# Patient Record
Sex: MALE | Race: BLACK OR AFRICAN AMERICAN | NOT HISPANIC OR LATINO | Employment: OTHER | URBAN - METROPOLITAN AREA
[De-identification: names, ages, dates, MRNs, and addresses within clinical notes are randomized per-mention and may not be internally consistent; named-entity substitution may affect disease eponyms.]

---

## 2018-08-10 ENCOUNTER — HOSPITAL ENCOUNTER (EMERGENCY)
Facility: HOSPITAL | Age: 58
Discharge: HOME | End: 2018-08-10
Attending: EMERGENCY MEDICINE
Payer: COMMERCIAL

## 2018-08-10 VITALS
SYSTOLIC BLOOD PRESSURE: 126 MMHG | DIASTOLIC BLOOD PRESSURE: 92 MMHG | RESPIRATION RATE: 19 BRPM | OXYGEN SATURATION: 100 % | HEIGHT: 71 IN | TEMPERATURE: 98 F | WEIGHT: 160 LBS | HEART RATE: 58 BPM | BODY MASS INDEX: 22.4 KG/M2

## 2018-08-10 DIAGNOSIS — F12.90 MARIJUANA USE: ICD-10-CM

## 2018-08-10 DIAGNOSIS — N18.9 CHRONIC RENAL IMPAIRMENT, UNSPECIFIED CKD STAGE: ICD-10-CM

## 2018-08-10 DIAGNOSIS — K57.90 DIVERTICULOSIS OF INTESTINE WITHOUT BLEEDING, UNSPECIFIED INTESTINAL TRACT LOCATION: Primary | ICD-10-CM

## 2018-08-10 DIAGNOSIS — R10.13 EPIGASTRIC PAIN: ICD-10-CM

## 2018-08-10 DIAGNOSIS — K21.9 GASTROESOPHAGEAL REFLUX DISEASE, ESOPHAGITIS PRESENCE NOT SPECIFIED: ICD-10-CM

## 2018-08-10 LAB
ALBUMIN SERPL-MCNC: 3.6 G/DL (ref 3.3–5.5)
ALP SERPL-CCNC: 64 U/L (ref 42–141)
BILIRUB SERPL-MCNC: 0.5 MG/DL (ref 0.2–1.6)
BILIRUBIN, POC UA: NEGATIVE
BLOOD, POC UA: NEGATIVE
BUN SERPL-MCNC: 21 MG/DL (ref 7–22)
CALCIUM SERPL-MCNC: 9.9 MG/DL (ref 8–10.3)
CHLORIDE SERPL-SCNC: 99 MMOL/L (ref 98–108)
CLARITY, POC UA: CLEAR
COLOR, POC UA: YELLOW
CREAT SERPL-MCNC: 1.5 MG/DL (ref 0.6–1.2)
GLUCOSE SERPL-MCNC: 97 MG/DL (ref 73–118)
GLUCOSE, POC UA: NEGATIVE
KETONES, POC UA: NEGATIVE
LEUKOCYTE EST, POC UA: NEGATIVE
NITRITE, POC UA: NEGATIVE
PH UR STRIP: 5.5 [PH]
POC ALT (SGPT): 16 U/L (ref 10–47)
POC AST (SGOT): 25 U/L (ref 11–38)
POC B-TYPE NATRIURETIC PEPTIDE: 45.3 PG/ML (ref 0–100)
POC CARDIAC TROPONIN I: 0 NG/ML
POC PTINR: 0.9 (ref 0.9–1.2)
POC PTWBT: 11.1 SEC (ref 9.7–14.3)
POC TCO2: 27 MMOL/L (ref 18–33)
POTASSIUM BLD-SCNC: 4 MMOL/L (ref 3.6–5.1)
PROTEIN, POC UA: NEGATIVE
PROTEIN, POC: 7.3 G/DL (ref 6.4–8.1)
SAMPLE: NORMAL
SAMPLE: NORMAL
SODIUM BLD-SCNC: 141 MMOL/L (ref 128–145)
SPECIFIC GRAVITY, POC UA: 1.02
UROBILINOGEN, POC UA: 0.2 E.U./DL

## 2018-08-10 PROCEDURE — 85025 COMPLETE CBC W/AUTO DIFF WBC: CPT

## 2018-08-10 PROCEDURE — S0028 INJECTION, FAMOTIDINE, 20 MG: HCPCS | Performed by: EMERGENCY MEDICINE

## 2018-08-10 PROCEDURE — 83880 ASSAY OF NATRIURETIC PEPTIDE: CPT

## 2018-08-10 PROCEDURE — 25000003 PHARM REV CODE 250: Performed by: EMERGENCY MEDICINE

## 2018-08-10 PROCEDURE — 80053 COMPREHEN METABOLIC PANEL: CPT

## 2018-08-10 PROCEDURE — 99284 EMERGENCY DEPT VISIT MOD MDM: CPT | Mod: 25

## 2018-08-10 PROCEDURE — 25000242 PHARM REV CODE 250 ALT 637 W/ HCPCS: Performed by: EMERGENCY MEDICINE

## 2018-08-10 PROCEDURE — 94640 AIRWAY INHALATION TREATMENT: CPT

## 2018-08-10 PROCEDURE — 93010 ELECTROCARDIOGRAM REPORT: CPT | Mod: ,,, | Performed by: INTERNAL MEDICINE

## 2018-08-10 PROCEDURE — 94760 N-INVAS EAR/PLS OXIMETRY 1: CPT

## 2018-08-10 PROCEDURE — 84484 ASSAY OF TROPONIN QUANT: CPT

## 2018-08-10 PROCEDURE — 85610 PROTHROMBIN TIME: CPT

## 2018-08-10 PROCEDURE — 96374 THER/PROPH/DIAG INJ IV PUSH: CPT

## 2018-08-10 PROCEDURE — 81003 URINALYSIS AUTO W/O SCOPE: CPT

## 2018-08-10 PROCEDURE — 93005 ELECTROCARDIOGRAM TRACING: CPT

## 2018-08-10 RX ORDER — ALBUTEROL SULFATE 90 UG/1
2 AEROSOL, METERED RESPIRATORY (INHALATION) EVERY 6 HOURS PRN
COMMUNITY

## 2018-08-10 RX ORDER — FAMOTIDINE 20 MG/1
20 TABLET, FILM COATED ORAL 2 TIMES DAILY
Qty: 30 TABLET | Refills: 0 | Status: SHIPPED | OUTPATIENT
Start: 2018-08-10 | End: 2019-08-10

## 2018-08-10 RX ORDER — IPRATROPIUM BROMIDE AND ALBUTEROL SULFATE 2.5; .5 MG/3ML; MG/3ML
3 SOLUTION RESPIRATORY (INHALATION) ONCE
Status: COMPLETED | OUTPATIENT
Start: 2018-08-10 | End: 2018-08-10

## 2018-08-10 RX ORDER — DIPHENHYDRAMINE HCL 25 MG
25 CAPSULE ORAL NIGHTLY PRN
Qty: 20 CAPSULE | Refills: 0 | Status: SHIPPED | OUTPATIENT
Start: 2018-08-10

## 2018-08-10 RX ORDER — PRAVASTATIN SODIUM 40 MG/1
40 TABLET ORAL DAILY
COMMUNITY

## 2018-08-10 RX ORDER — AMLODIPINE BESYLATE 5 MG/1
5 TABLET ORAL DAILY
COMMUNITY

## 2018-08-10 RX ORDER — FAMOTIDINE 10 MG/ML
20 INJECTION INTRAVENOUS
Status: COMPLETED | OUTPATIENT
Start: 2018-08-10 | End: 2018-08-10

## 2018-08-10 RX ORDER — ASPIRIN 325 MG
325 TABLET ORAL
Status: COMPLETED | OUTPATIENT
Start: 2018-08-10 | End: 2018-08-10

## 2018-08-10 RX ORDER — LISINOPRIL AND HYDROCHLOROTHIAZIDE 12.5; 2 MG/1; MG/1
1 TABLET ORAL DAILY
COMMUNITY

## 2018-08-10 RX ADMIN — ASPIRIN 325 MG ORAL TABLET 325 MG: 325 PILL ORAL at 02:08

## 2018-08-10 RX ADMIN — FAMOTIDINE 20 MG: 10 INJECTION, SOLUTION INTRAVENOUS at 02:08

## 2018-08-10 RX ADMIN — LIDOCAINE HYDROCHLORIDE: 20 SOLUTION ORAL; TOPICAL at 02:08

## 2018-08-10 RX ADMIN — IPRATROPIUM BROMIDE AND ALBUTEROL SULFATE 3 ML: .5; 2.5 SOLUTION RESPIRATORY (INHALATION) at 02:08

## 2018-08-10 NOTE — ED PROVIDER NOTES
Encounter Date: 8/10/2018    SCRIBE #1 NOTE: I, Marlin Tubbs, am scribing for, and in the presence of,  Dr. Ford. I have scribed the entire note.       History     Chief Complaint   Patient presents with    Abdominal Pain     pt reports epigastric pain x 2 months and burning sensation has gotten worse over the past week. pt denies chest pain, SOB or N/V/D.     57 y.o male presents with acute on chronic abdominal pain for 2 months. He describes his pain as burning in quality. He took half a dose of zantac this morning for pain and little relief. He came in today due to his pain worsening and not being able to sleep. He has associated intermittent dysuria and increased urine frequency. He denies chest pain, vomiting, nausea, diarrhea, and SOB. He saw his PCP a month ago, but he reports this complaint was not addressed. He has a medical history of peptic ulcer disease and is not sure when he was diagnosed. He also has not seen a gastroenterologist. He reports he smokes marijuana everyday. He rates his pain as a 5/10.      The history is provided by the patient.   Abdominal Pain   The current episode started several weeks ago. The onset of the illness was abrupt. The problem has been gradually worsening. The abdominal pain is located in the epigastric region. The abdominal pain does not radiate. The severity of the abdominal pain is 5/10. The abdominal pain is relieved by nothing. The other symptoms of the illness do not include fever, shortness of breath, nausea, vomiting, diarrhea or dysuria.   The patient states that she believes she is currently not pregnant. The patient has not had a change in bowel habit. Additional symptoms associated with the illness include frequency. Symptoms associated with the illness do not include back pain. Significant associated medical issues include PUD.     Review of patient's allergies indicates:  No Known Allergies  Past Medical History:   Diagnosis Date    Alcohol abuse      Duodenal ulcer disease 2013    History of bleeding ulcers 2013    gastric ulcer s/p clip    Hypertension     PUD (peptic ulcer disease) 2013     No past surgical history on file.  No family history on file.  Social History   Substance Use Topics    Smoking status: Current Some Day Smoker     Packs/day: 0.50    Smokeless tobacco: Not on file    Alcohol use Yes      Comment: Socially     Review of Systems   Constitutional: Negative for fever.   HENT: Negative for sore throat.    Respiratory: Negative for shortness of breath.    Cardiovascular: Negative for chest pain.   Gastrointestinal: Positive for abdominal pain. Negative for blood in stool, diarrhea, nausea and vomiting.   Genitourinary: Positive for frequency. Negative for dysuria.   Musculoskeletal: Negative for back pain.   Skin: Negative for rash.   Neurological: Negative for weakness.   Hematological: Does not bruise/bleed easily.       Physical Exam     Initial Vitals [08/10/18 1331]   BP Pulse Resp Temp SpO2   (!) 130/98 67 19 98 °F (36.7 °C) 98 %      MAP       --         Physical Exam    Nursing note and vitals reviewed.  Constitutional: He appears well-developed and well-nourished. He is not diaphoretic. No distress.   HENT:   Head: Normocephalic and atraumatic.   Eyes: EOM are normal.   Neck: Normal range of motion. Neck supple.   Cardiovascular: Normal rate, regular rhythm and normal heart sounds. Exam reveals no gallop and no friction rub.    No murmur heard.  Pulmonary/Chest: No respiratory distress. He has wheezes (expiratory). He has no rhonchi. He has no rales.   Abdominal: Soft. He exhibits no distension. There is tenderness in the epigastric area. There is no rebound and no guarding.   Neurological: He is alert and oriented to person, place, and time.   Skin: Skin is warm and dry.         ED Course   Procedures  Labs Reviewed   POCT URINALYSIS W/O SCOPE - Abnormal; Notable for the following components:       Result Value    Glucose, UA  Negative (*)     Bilirubin, UA Negative (*)     Ketones, UA Negative (*)     Blood, UA Negative (*)     Protein, UA Negative (*)     Nitrite, UA Negative (*)     Leukocytes, UA Negative (*)     All other components within normal limits   POCT CMP - Abnormal; Notable for the following components:    POC Creatinine 1.5 (*)     All other components within normal limits   TROPONIN ISTAT   POCT CBC   POCT URINALYSIS W/O SCOPE   POCT CMP   POCT PROTIME-INR   POCT TROPONIN   POCT B-TYPE NATRIURETIC PEPTIDE (BNP)   POCT B-TYPE NATRIURETIC PEPTIDE (BNP)   ISTAT PROCEDURE     CBC white blood cell count 4.6, hemoglobin 12.5, hematocrit 38.6 and platelets 270  CMP sodium 141, potassium 4.0, bicarb 29, chloride 99, glucose 97, BUN 21 creatinine 0.0 1.5.  When compared to prior labs 5 years ago patient's BUN was 65 and creatinine is 1.4.  Labs today are improved  Troponin 0.00.  BNP is 45, INR is 0.9.    EKG Readings: (Independently Interpreted)   Initial Reading: No STEMI. Previous EKG: Compared with most recent EKG Previous EKG Date: 1/21/2018 rate increased by 6 BPM today. Rhythm: Normal Sinus Rhythm. Heart Rate: 67. Axis: Normal. Other Impression: [id number] abnormal: Patient has LVH criteria         Imaging Results          CT Abdomen Pelvis  Without Contrast (Final result)  Result time 08/10/18 14:51:38   Procedure changed from CT Abdomen Pelvis With Contrast     Final result by Feliciano Mejia MD (08/10/18 14:51:38)                 Impression:      Colonic diverticulosis without evidence for acute diverticulitis.    The cause of the patient's abdominal pain is not evident on this examination.      Electronically signed by: Feliciano Mejia MD  Date:    08/10/2018  Time:    14:51             Narrative:    EXAMINATION:  CT ABDOMEN PELVIS WITHOUT CONTRAST    CLINICAL HISTORY:  Abdominal pain, unspecified;Epigastric abdominal pain;    TECHNIQUE:  Low dose axial images, sagittal and coronal reformations were obtained from the lung  bases to the pubic symphysis.  Oral contrast was not administered.    COMPARISON:  None    FINDINGS:  The lung bases are clear.  The bones are intact.  There is no evidence for acute fracture or bone destruction.  The liver is normal in size and is homogeneous in density with no focal abnormalities of the liver identified.  The gallbladder is present and appears unremarkable.  There is no evidence for intrahepatic or extrahepatic biliary dilatation on this examination.  The spleen, stomach, and pancreas appear grossly unremarkable.  The adrenal glands are not enlarged.  The kidneys are normal in size, position, and contour and are noted to concentrate and excrete contrast symmetrically.  The appendix is present and appears unremarkable.  No dilated loops of bowel are of are evident.  There are a few uncomplicated colonic diverticula identified, however there is no evidence for acute diverticulitis.  The urinary bladder appears unremarkable.  There is no evidence for pelvic or inguinal lymphadenopathy.                               X-Ray Chest PA And Lateral (Final result)  Result time 08/10/18 14:42:58    Final result by Feliciano Mejia MD (08/10/18 14:42:58)                 Impression:      No acute chest disease identified.      Electronically signed by: Feliciano Mejia MD  Date:    08/10/2018  Time:    14:42             Narrative:    EXAMINATION:  XR CHEST PA AND LATERAL    CLINICAL HISTORY:  Epigastric pain;    TECHNIQUE:  PA and lateral views of the chest were performed.    COMPARISON:  01/22/2013.    FINDINGS:  The heart is not enlarged.  Superior mediastinal structures are unremarkable.  Pulmonary vasculature is within normal limits.  The lungs are well aerated and free of focal consolidations.  There is no evidence for pneumothorax or pleural effusions.  Bony structures appear intact.                                 Medical Decision Making:   Clinical Tests:   Lab Tests: Ordered and Reviewed  Radiological  Study: Ordered and Reviewed  Medical Tests: Ordered and Reviewed  Treatment in the ED Physical Exam, GI cocktail, Pepcid, and albuterol.  Patient reports total resolution of symptoms after medication.   The patient denies any rectal bleeding.  Discussed labs, and imaging results.    The patient is to follow up with GI in 1 day.  Patient's follow up in primary care 1 day.  Patient expresses understanding and states he will return if he does not feel better or if he had his symptoms worsen  Fill and take prescriptions as directed.  Return to the ED if symptoms worsen or do not resolve.   Answered questions and discussed discharge plan.    Patient feels much better and is ready for discharge.  Follow up with PCP in 1 days.            Scribe Attestation:   Scribe #1: I performed the above scribed service and the documentation accurately describes the services I performed. I attest to the accuracy of the note.     I, Dr. Mary Ford, personally performed the services described in this documentation. This document was produced by a scribe under my direction and in my presence. All medical record entries made by the scribe were at my direction and in my presence.  I have reviewed the chart and agree that the record reflects my personal performance and is accurate and complete. Mary Ford DO.     08/10/2018 4:12 PM             Clinical Impression:     1. Diverticulosis of intestine without bleeding, unspecified intestinal tract location    2. Epigastric pain    3. Gastroesophageal reflux disease, esophagitis presence not specified    4. Chronic renal impairment, unspecified CKD stage    5. Marijuana use                               Mary Ford DO  08/13/18 [id number]

## 2018-08-27 ENCOUNTER — HOSPITAL ENCOUNTER (EMERGENCY)
Facility: HOSPITAL | Age: 58
Discharge: HOME | End: 2018-08-27
Attending: EMERGENCY MEDICINE
Payer: COMMERCIAL

## 2018-08-27 VITALS
WEIGHT: 166 LBS | HEART RATE: 75 BPM | OXYGEN SATURATION: 98 % | RESPIRATION RATE: 18 BRPM | BODY MASS INDEX: 23.77 KG/M2 | SYSTOLIC BLOOD PRESSURE: 136 MMHG | TEMPERATURE: 99 F | DIASTOLIC BLOOD PRESSURE: 95 MMHG | HEIGHT: 70 IN

## 2018-08-27 DIAGNOSIS — R42 DIZZINESS: ICD-10-CM

## 2018-08-27 DIAGNOSIS — D64.9 ANEMIA, UNSPECIFIED TYPE: Primary | ICD-10-CM

## 2018-08-27 LAB
ALBUMIN SERPL-MCNC: 3.3 G/DL (ref 3.3–5.5)
ALP SERPL-CCNC: 66 U/L (ref 42–141)
BILIRUB SERPL-MCNC: 0.5 MG/DL (ref 0.2–1.6)
BUN SERPL-MCNC: 15 MG/DL (ref 7–22)
CALCIUM SERPL-MCNC: 9.4 MG/DL (ref 8–10.3)
CHLORIDE SERPL-SCNC: 100 MMOL/L (ref 98–108)
CREAT SERPL-MCNC: 1.5 MG/DL (ref 0.6–1.2)
CTP QC/QA: YES
FECAL OCCULT BLOOD, POC: NEGATIVE
GLUCOSE SERPL-MCNC: 93 MG/DL (ref 73–118)
POC ALT (SGPT): 18 U/L (ref 10–47)
POC AST (SGOT): 21 U/L (ref 11–38)
POC TCO2: 28 MMOL/L (ref 18–33)
POTASSIUM BLD-SCNC: 3.7 MMOL/L (ref 3.6–5.1)
PROTEIN, POC: 6.7 G/DL (ref 6.4–8.1)
SODIUM BLD-SCNC: 142 MMOL/L (ref 128–145)

## 2018-08-27 PROCEDURE — 80053 COMPREHEN METABOLIC PANEL: CPT

## 2018-08-27 PROCEDURE — 99284 EMERGENCY DEPT VISIT MOD MDM: CPT

## 2018-08-27 PROCEDURE — 85025 COMPLETE CBC W/AUTO DIFF WBC: CPT

## 2018-08-27 PROCEDURE — 82270 OCCULT BLOOD FECES: CPT

## 2018-08-27 PROCEDURE — 93010 ELECTROCARDIOGRAM REPORT: CPT | Mod: ,,, | Performed by: INTERNAL MEDICINE

## 2018-08-27 PROCEDURE — 93005 ELECTROCARDIOGRAM TRACING: CPT

## 2018-08-28 NOTE — ED NOTES
Pt reports he went to give plasma on Saturday and was told his HCT was low and to go to his PCP to get an order for blood work. Pt presented to ED tonight stating he has been dizzy off and on for 2 weeks. Reports drinking ETOH daily. Denies any pain other than chronic abd pain associated with ETOH consumption. Denies any other symptoms. RR even and unlabored, with expiratory wheezes present in bilateral lung bases. Pt is AAOx4, no acute distress noted, orthostatic vitals WNL. Will continue to monitor.

## 2018-08-28 NOTE — ED PROVIDER NOTES
Encounter Date: 8/27/2018    SCRIBE #1 NOTE: I, Desi Serna, am scribing for, and in the presence of,  Dr. Cole. I have scribed the following portions of the note - Other sections scribed: HPI, ROS, PE.       History     Chief Complaint   Patient presents with    Dizziness     pt presents with c/o dizziness x2 weeks; pt states he attempted to donate blood, but his Hct levels were low and he was informed to follow up with his doctor     This is a 57 year old male, with a hx of HTN, who presents to the ED complaining of dizziness since yesterday. He denies dizziness today. He reports a dizzy spell yesterday while standing in his kitchen. He states the room was spinning and he felt lightheaded. He denies syncope, nausea, vomiting, chest pain, shortness of breath, or headache. He states he had to lie down afterwards and he felt better.    He denies blood in his stool, black stool, hematemesis, hematuria, or weight changes.    He reports he donated plasma twice last week. He was told he had low hematocrit and a low iron count. He has never been told this before.     He reports taking his HTN medication daily.    He reports he has a hx of stomach ulcers and takes medication daily for ulcers.     He reports occasional EtOH use.         The history is provided by the patient.     Review of patient's allergies indicates:  No Known Allergies  Past Medical History:   Diagnosis Date    Alcohol abuse     Duodenal ulcer disease 2013    History of bleeding ulcers 2013    gastric ulcer s/p clip    Hypertension     PUD (peptic ulcer disease) 2013     Past Surgical History:   Procedure Laterality Date    EGD (ESOPHAGOGASTRODUODENOSCOPY) N/A 4/24/2013    Performed by Reji Phillip [facility] at [facility] (4TH FLR)    EGD (ESOPHAGOGASTRODUODENOSCOPY) N/A 1/23/2013    Performed by Reji Phillip MD at [facility] (2ND FLR)     No family history on file.  Social History     Tobacco Use    Smoking status: Current  Some Day Smoker     Packs/day: 0.50   Substance Use Topics    Alcohol use: Yes     Comment: Socially    Drug use: Yes     Types: Marijuana     Review of Systems   Constitutional: Negative for unexpected weight change.   Respiratory: Negative for shortness of breath.    Cardiovascular: Negative for chest pain.   Gastrointestinal: Negative for blood in stool, nausea and vomiting.   Genitourinary: Negative for hematuria.   Musculoskeletal: Negative for gait problem.   Neurological: Positive for dizziness and light-headedness. Negative for syncope and headaches.   All other systems reviewed and are negative.      Physical Exam     Initial Vitals [08/27/18 1943]   BP Pulse Resp Temp SpO2   (!) 137/95 87 18 99.1 °F (37.3 °C) 98 %      MAP       --         Physical Exam    Nursing note and vitals reviewed.  Constitutional: He appears well-developed and well-nourished.   HENT:   Head: Normocephalic and atraumatic.   Right Ear: External ear normal.   Left Ear: External ear normal.   Nose: Nose normal.   Eyes: Conjunctivae and EOM are normal. Pupils are equal, round, and reactive to light.   Neck: Normal range of motion. Neck supple.   Cardiovascular: Normal rate and intact distal pulses.   Pulmonary/Chest: No respiratory distress.   Musculoskeletal: Normal range of motion.   Neurological: He is alert and oriented to person, place, and time. He has normal strength. No cranial nerve deficit or sensory deficit. Coordination and gait normal. GCS score is 15. GCS eye subscore is 4. GCS verbal subscore is 5. GCS motor subscore is 6.   Skin: Skin is warm and dry. Capillary refill takes less than 2 seconds.   Psychiatric: He has a normal mood and affect. His speech is normal and behavior is normal.         ED Course   Procedures  Labs Reviewed   POCT CMP - Abnormal; Notable for the following components:       Result Value    Albumin, POC 3.3 (*)     POC Creatinine 1.5 (*)     All other components within normal limits   POCT CBC  "  POCT OCCULT BLOOD STOOL   POCT CMP     EKG Readings: (Independently Interpreted)   Initial Reading: No STEMI. Rhythm: Normal Sinus Rhythm. Heart Rate: 85. Ectopy: No Ectopy. Conduction: Normal. ST Segments: Normal ST Segments. T Waves: Normal. Other Impression: LAE       Imaging Results    None          Medical Decision Making:   Initial Assessment:   Pt has note with him from [facility] that reads "Donor hematocrit continued to be low 8/23/2018 (37%), 8/24/2018 (38%), 8/25/2018 (35%). Donor would like to donate plasma 2x week, please advise this is safe with these low hematocrit"   Clinical Tests:   Lab Tests: Ordered and Reviewed       <> Summary of Lab: White count 5.6 H&H 12.3 and 34.6 platelets 252 MCV 87.8 RDW 12.4  Previous H&H 12.5 and 38.6 from August 10, 2018  Medical Tests: Ordered and Reviewed            Scribe Attestation:   Scribe #1: I performed the above scribed service and the documentation accurately describes the services I performed. I attest to the accuracy of the note.      Vitals:    08/27/18 2104 08/27/18 2215 08/27/18 2259 08/27/18 2300   BP: (!) 127/91 128/85 (!) 136/95    BP Location:       Patient Position: Standing      Pulse: 81  75    Resp:    18   Temp:    98.9 °F (37.2 °C)   TempSrc:    Oral   SpO2:   98%    Weight:       Height:         Results for NEIL AGUILERA (MRN [id number]) as of 8/27/2018 22:30   Ref. Range 1/21/2013 15:50 1/21/2013 16:00 1/21/2013 21:58 1/22/2013 03:44 1/23/2013 03:56 1/23/2013 03:56 1/24/2013 03:02 1/25/2013 03:05   Creatinine Latest Ref Range: 0.5 - 1.4 mg/dl 7.7 (H)  6.4 (H) 5.7 (H) 4.0 (H)  3.2 (H) 2.4 (H)       Labs Reviewed  Admission on 08/27/2018, Discharged on 08/27/2018   Component Date Value Ref Range Status    Albumin, POC 08/27/2018 3.3* 3.3 - 5.5 g/dL Final    Alkaline Phosphatase, POC 08/27/2018 66  42 - 141 U/L Final    ALT (SGPT), POC 08/27/2018 18  10 - 47 U/L Final    AST (SGOT), POC 08/27/2018 21  11 - 38 U/L Final    POC BUN " 08/27/2018 15  7 - 22 mg/dL Final    Calcium, POC 08/27/2018 9.4  8.0 - 10.3 mg/dL Final    POC Chloride 08/27/2018 100  98 - 108 mmol/L Final    POC Creatinine 08/27/2018 1.5* 0.6 - 1.2 mg/dL Final    POC Glucose 08/27/2018 93  73 - 118 mg/dL Final    POC Potassium 08/27/2018 3.7  3.6 - 5.1 mmol/L Final    POC Sodium 08/27/2018 142  128 - 145 mmol/L Final    Bilirubin 08/27/2018 0.5  0.2 - 1.6 mg/dL Final    POC TCO2 08/27/2018 28  18 - 33 mmol/L Final    Protein 08/27/2018 6.7  6.4 - 8.1 g/dL Final    Fecal Occult Blood 08/27/2018 Negative  Negative Final     Acceptable 08/27/2018 Yes   Final        Imaging Reviewed    Imaging Results    None         Medications given in ED    Medications - No data to display    This document was produced by a scribe under my direction and in my presence. I agree with the content of the note and have made any necessary edits.     Adeel Cole MD         Note was created using voice recognition software. Note may have occasional typographical errors that may not have been identified and edited despite good nahed initial review prior to signing.             Clinical Impression:     1. Anemia, unspecified type    2. Dizziness                                   Adeel Cole MD  09/17/18 [id number]

## 2023-11-06 ENCOUNTER — TELEPHONE (OUTPATIENT)
Dept: PODIATRY | Facility: CLINIC | Age: 63
End: 2023-11-06
Payer: MEDICARE

## 2023-11-06 NOTE — TELEPHONE ENCOUNTER
Staff gave shadia Barraza the correct fax number to send patient referral.    shadia Barraza verbalized understanding.      ----- Message from Ila Berg sent at 11/6/2023 10:50 AM CST -----  Regarding: Referral confirmation  Contact: [phone]  Regarding: Referral had been sent to [phone], will resend to [phone] fax    Contact: Ashley Bradford    Type: Call back to confirm/ discuss appt options    Who Called: shadia Barraza    Would the patient rather a call back or a response via [facility]? Phone call    Best Call Back Number: [phone]  Fax: [phone]    Additional Information:   Provided fax: [phone]

## 2023-11-07 ENCOUNTER — TELEPHONE (OUTPATIENT)
Dept: PODIATRY | Facility: CLINIC | Age: 63
End: 2023-11-07
Payer: MEDICARE

## 2023-11-07 NOTE — TELEPHONE ENCOUNTER
Staff informed [facility] staff has not receive referral for patient. Staff gave Renetta fax number again.     Renetta stated she faxed paperwork.    Staff verbalized understanding.      ----- Message from Ila Berg sent at 11/7/2023 12:29 PM CST -----  Regarding: Call back about appt scheduling with referral  Contact: [phone]  Regarding: Referral not in  if sent to [facility] fax than [phone], it will need to be manually input into Epic for scheduling    Contact: Christi    Type: Call back to schedule appointments    Who Called: Christi Bradford    Would the patient rather a call back or a response via [facility]?     Best Call Back Number:[phone]

## 2024-10-20 ENCOUNTER — OFFICE VISIT (OUTPATIENT)
Dept: URGENT CARE | Facility: CLINIC | Age: 64
End: 2024-10-20
Payer: MEDICARE

## 2024-10-20 VITALS
WEIGHT: 166 LBS | OXYGEN SATURATION: 95 % | DIASTOLIC BLOOD PRESSURE: 112 MMHG | TEMPERATURE: 99 F | BODY MASS INDEX: 23.77 KG/M2 | SYSTOLIC BLOOD PRESSURE: 166 MMHG | HEART RATE: 78 BPM | HEIGHT: 70 IN | RESPIRATION RATE: 18 BRPM

## 2024-10-20 DIAGNOSIS — S05.11XA EYE BRUISE, RIGHT, INITIAL ENCOUNTER: ICD-10-CM

## 2024-10-20 DIAGNOSIS — H10.31 ACUTE CONJUNCTIVITIS OF RIGHT EYE, UNSPECIFIED ACUTE CONJUNCTIVITIS TYPE: ICD-10-CM
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
## 2024-10-20 DIAGNOSIS — S00.81XA ABRASION OF FACE, INITIAL ENCOUNTER: ICD-10-CM

## 2024-10-20 PROCEDURE — 99203 OFFICE O/P NEW LOW 30 MIN: CPT | Mod: S$GLB,,,

## 2024-10-20 RX ORDER — LISINOPRIL 10 MG/1
10 TABLET ORAL
COMMUNITY

## 2024-10-20 RX ORDER — METOPROLOL SUCCINATE 50 MG/1
50 TABLET, EXTENDED RELEASE ORAL
COMMUNITY
Start: 2024-10-15

## 2024-10-20 RX ORDER — POLYMYXIN B SULFATE AND TRIMETHOPRIM 1; 10000 MG/ML; [USP'U]/ML
1 SOLUTION OPHTHALMIC EVERY 6 HOURS
Qty: 10 ML | Refills: 0 | Status: SHIPPED | OUTPATIENT
Start: 2024-10-20

## 2024-10-20 RX ORDER — TIOTROPIUM BROMIDE 18 UG/1
1 CAPSULE ORAL; RESPIRATORY (INHALATION)
COMMUNITY
Start: 2024-07-16

## 2024-10-20 RX ORDER — TRAZODONE HYDROCHLORIDE 100 MG/1
100 TABLET ORAL NIGHTLY
COMMUNITY
Start: 2024-08-26

## 2024-10-20 RX ORDER — MELOXICAM 15 MG/1
15 TABLET ORAL
COMMUNITY
Start: 2024-08-26

## 2024-10-20 RX ORDER — MUPIROCIN 20 MG/G
OINTMENT TOPICAL 3 TIMES DAILY
Qty: 22 G | Refills: 0 | Status: SHIPPED | OUTPATIENT
Start: 2024-10-20

## 2024-10-20 NOTE — PATIENT INSTRUCTIONS
Tylenol or ibuprofen for pain.    Apply Bactroban to the abrasion on your face.  Be careful not to get the ointment into your eyes.  Use the antibiotic eyedrops as prescribed for the next 7 days.  You can apply ice to right eye.    - Follow up with your PCP or specialty clinic as directed in the next 1-2 weeks if not improved or as needed.  You can call [phone] to schedule an appointment with the appropriate provider.    - Go to the ER or seek medical attention immediately if you develop new or worsening symptoms.

## 2024-10-20 NOTE — PROGRESS NOTES
"Subjective:      Patient ID: Garret Stanley is a 64 y.o. male.    Vitals:  height is 5' 10" (1.778 m) and weight is 75.3 kg (166 lb 0.1 oz). His oral temperature is 98.5 °F (36.9 °C). His blood pressure is 166/112 (abnormal) and his pulse is 78. His respiration is 18 and oxygen saturation is 95%.     Chief Complaint: Eye Injury    64-year-old male here today for right eye redness and pain that started yesterday.  Patient was outside when he had tripped.  He fell forward.  He caught himself with his arms but hit the right side of his face on the grass.  He is not on any blood thinners.   Blood pressure elevated at triage, but patient states that he has not taken his blood pressure medication. He wears reading glasses. Denies headaches, neck pain or stiffness, dizziness, LOC, blurry vision, photophobia, pain with EOMs, nausea or vomiting, chest pain, SoB, numbness or tingling, focal weakness, abd pain, leg swelling or pain.     Eye Injury   The right eye is affected. This is a new problem. The current episode started yesterday. The problem occurs constantly. The problem has been gradually worsening. Injury mechanism: Fall. The pain is at a severity of 4/10. The pain is mild. There is No known exposure to pink eye. He Does not wear contacts. Associated symptoms include an eye discharge, eye redness and itching. Pertinent negatives include no blurred vision, double vision, fever, nausea, photophobia or vomiting. He has tried eye drops for the symptoms. The treatment provided no relief.       Constitution: Negative for chills and fever.   Neck: Negative for neck pain and neck stiffness.   Cardiovascular:  Negative for chest pain.   Eyes:  Positive for eye discharge, eye itching, eye pain and eye redness. Negative for eye trauma, foreign body in eye, photophobia, vision loss, double vision, blurred vision and eyelid swelling.   Respiratory:  Negative for cough and shortness of breath.    Gastrointestinal:  Negative for " nausea, vomiting and diarrhea.   Skin:  Negative for rash.   Neurological:  Negative for dizziness, light-headedness, passing out, headaches, numbness and tingling.      Objective:     Physical Exam   Constitutional: He is oriented to person, place, and time. He appears well-developed.   HENT:   Head: Normocephalic and atraumatic. Head is without raccoon's eyes and without Deluna's sign.   Ears:   Right Ear: Tympanic membrane, external ear and ear canal normal.   Left Ear: Tympanic membrane, external ear and ear canal normal.   Nose: Nose normal.   Mouth/Throat: Oropharynx is clear and moist. Mucous membranes are moist. Oropharynx is clear.   Eyes: Right eye visual fields normal and left eye visual fields normal. EOM and lids are normal. Pupils are equal, round, and reactive to light. Lids are everted and swept, no foreign bodies found. Right eye exhibits discharge. Right eye exhibits no hordeolum. No foreign body present in the right eye. Right conjunctiva is injected.   Slit lamp exam:       The right eye shows no corneal abrasion and no fluorescein uptake. Extraocular movement intact      Comments: R conjunctival injection. Slight bruising and tenderness to R lower eyelid. No periorbital edema. No pain with EOMs.  No photophobia.    Vision Screening  Right eye - Without correction: 20/25  With correction:   Left eye - Without correction: 20/30  With correction:   Both eyes - Without correction: 2020  With correction:      right eye Machelle exam negative    Neck: Trachea normal and phonation normal. Neck supple.      Comments: No midline cervical tenderness.   Cardiovascular: Normal rate, regular rhythm, normal heart sounds and normal pulses.   Pulmonary/Chest: Effort normal and breath sounds normal. No respiratory distress.   Musculoskeletal: Normal range of motion.         General: Normal range of motion.   Neurological: no focal deficit. He is alert and oriented to person, place, and time. He has normal motor  skills, normal sensation and intact cranial nerves (2-12). No cranial nerve deficit. Gait and coordination normal. GCS eye subscore is 4. GCS verbal subscore is 5. GCS motor subscore is 6.   Skin: Skin is warm, dry and intact. Capillary refill takes less than 2 seconds.   Psychiatric: His speech is normal and behavior is normal. Judgment and thought content normal.   Nursing note and vitals reviewed.      Assessment:     1. Fall, initial encounter    2. Acute conjunctivitis of right eye, unspecified acute conjunctivitis type    3. Eye bruise, right, initial encounter    4. Abrasion of face, initial encounter        Plan:     Fall, initial encounter    Acute conjunctivitis of right eye, unspecified acute conjunctivitis type  -     polymyxin B sulf-trimethoprim (POLYTRIM) 10,000 unit- 1 mg/mL Drop; Place 1 drop into the right eye every 6 (six) hours.  Dispense: 10 mL; Refill: 0    Eye bruise, right, initial encounter    Abrasion of face, initial encounter  -     mupirocin (BACTROBAN) 2 % ointment; Apply topically 3 (three) times daily.  Dispense: 22 g; Refill: 0    64-year-old male here today for right eye redness and pain that started yesterday.  He had tripped on grass.  He fell forward.  He comes up with his arms and hit the right side of his face on the grass.  Blood pressure elevated 166/112.  Other vital signs are within normal limits.  Patient states that he had not taken his blood pressure medication today it.  Denies any chest pain, SOB, nausea or vomiting, LOC, dizziness, photophobia, blurry or double vision.  On exam, patient is nontoxic and in no acute distress.  Lungs CTAB.  RRR.  PERRLA, EOMI.  Good visual acuity.  Visual fields full to confrontation.  There is some mild right conjunctival injection.  Some bruising and tenderness to the right lower eyelid.  Also a superficial abrasion near the right eyelid.  No laceration.  No pain with EOMs.  No photophobia.  No corneal abrasion on fluorescein exam.   Machelle is negative. Negative Battles sign. Full range of motion of neck.  No midline cervical tenderness.  Neurologically intact without focal deficits. CNs II-XII intact.  Low suspicion for intracranial hemorrhage.  Do not feel CT head or neck needed at this time.  Do not suspect orbital fracture or globe injury.  The abrasion still had some grass covering it, which was cleaned in the clinic.  Sent Bactroban for the abrasion.  Polytrim for conjunctivitis.  Discussed with patient ibuprofen or Tylenol for pain, and that he can ice the area.  He will be taking his blood pressure medication once he gets home.  Strict ED precautions discussed with patient.    Patient Instructions   Tylenol or ibuprofen for pain.    Apply Bactroban to the abrasion on your face.  Be careful not to get the ointment into your eyes.  Use the antibiotic eyedrops as prescribed for the next 7 days.  You can apply ice to right eye.    - Follow up with your PCP or specialty clinic as directed in the next 1-2 weeks if not improved or as needed.  You can call [phone] to schedule an appointment with the appropriate provider.    - Go to the ER or seek medical attention immediately if you develop new or worsening symptoms.

## 2024-10-20 NOTE — LETTER
October 20, 2024      [facility]  [address], [address]  [place] [address]  Phone: [phone]  Fax: [phone]       Patient: Garret Stanley   YOB: 1960  Date of Visit: 10/20/2024    To Whom It May Concern:    Matilda Stanley  was at [facility] on 10/20/2024. The patient may return to work on 10/22/24. If you have any questions or concerns, or if I can be of further assistance, please do not hesitate to contact me.    Sincerely,    Adiel Galarza PA-C